# Patient Record
Sex: FEMALE | Race: WHITE | Employment: FULL TIME | URBAN - METROPOLITAN AREA
[De-identification: names, ages, dates, MRNs, and addresses within clinical notes are randomized per-mention and may not be internally consistent; named-entity substitution may affect disease eponyms.]

---

## 2022-08-14 ENCOUNTER — HOSPITAL ENCOUNTER (OUTPATIENT)
Age: 67
Discharge: HOME | End: 2022-08-14
Payer: COMMERCIAL

## 2022-08-14 VITALS
SYSTOLIC BLOOD PRESSURE: 131 MMHG | OXYGEN SATURATION: 100 % | DIASTOLIC BLOOD PRESSURE: 87 MMHG | RESPIRATION RATE: 19 BRPM | TEMPERATURE: 98 F | HEART RATE: 82 BPM

## 2022-08-14 DIAGNOSIS — J06.9 VIRAL UPPER RESPIRATORY TRACT INFECTION: Primary | ICD-10-CM

## 2022-08-14 RX ORDER — BENZONATATE 200 MG/1
200 CAPSULE ORAL 3 TIMES DAILY PRN
Qty: 15 CAPSULE | Refills: 0 | Status: SHIPPED | OUTPATIENT
Start: 2022-08-14

## 2022-11-05 ENCOUNTER — APPOINTMENT (OUTPATIENT)
Dept: GENERAL RADIOLOGY | Facility: HOSPITAL | Age: 67
End: 2022-11-05
Attending: EMERGENCY MEDICINE
Payer: COMMERCIAL

## 2022-11-05 ENCOUNTER — HOSPITAL ENCOUNTER (EMERGENCY)
Facility: HOSPITAL | Age: 67
Discharge: HOME | End: 2022-11-05
Attending: EMERGENCY MEDICINE
Payer: COMMERCIAL

## 2022-11-05 VITALS
DIASTOLIC BLOOD PRESSURE: 77 MMHG | HEART RATE: 77 BPM | TEMPERATURE: 98 F | SYSTOLIC BLOOD PRESSURE: 120 MMHG | RESPIRATION RATE: 20 BRPM | OXYGEN SATURATION: 96 %

## 2022-11-05 DIAGNOSIS — M25.561 RIGHT KNEE PAIN, UNSPECIFIED CHRONICITY: ICD-10-CM

## 2022-11-05 DIAGNOSIS — M25.519 SHOULDER PAIN, UNSPECIFIED CHRONICITY, UNSPECIFIED LATERALITY: Primary | ICD-10-CM

## 2022-11-05 PROCEDURE — 73560 X-RAY EXAM OF KNEE 1 OR 2: CPT | Performed by: EMERGENCY MEDICINE

## 2022-11-05 PROCEDURE — 73030 X-RAY EXAM OF SHOULDER: CPT | Performed by: EMERGENCY MEDICINE

## 2022-11-05 PROCEDURE — 99284 EMERGENCY DEPT VISIT MOD MDM: CPT

## 2022-11-06 NOTE — ED INITIAL ASSESSMENT (HPI)
Patient arrives ambulatory through triage with c/o R. Shoulder pain and R. Knee pain after moving boxes at her office yesterday.

## 2022-11-06 NOTE — DISCHARGE INSTRUCTIONS
Please follow-up with your primary care provider in the next several days for reevaluation of your pain. If your symptoms continue, you may require an MRI of injured area. At this time there is no fracture noted on x-ray. Please elevate your extremity above your heart to reduce swelling and take over-the-counter pain medication as needed for pain. You may take 600 mg of ibuprofen every 6 hours as needed for pain with food for the next 3-5 days if you do not have any kidney problems. The Emergency Department is not intended to be a substitute for an effort to provide complete medical care. The imaging, if any, have often been interpreted on a preliminary basis pending final reading by the radiologist. If your blood pressure was greater than 140/90, please have this blood pressure rechecked by your primary care provider in the next several days. in the next several days.

## 2023-04-21 ENCOUNTER — APPOINTMENT (OUTPATIENT)
Dept: CT IMAGING | Facility: HOSPITAL | Age: 68
End: 2023-04-21
Attending: EMERGENCY MEDICINE
Payer: COMMERCIAL

## 2023-04-21 ENCOUNTER — HOSPITAL ENCOUNTER (EMERGENCY)
Facility: HOSPITAL | Age: 68
Discharge: STILL ADMITTED OR ADMINISTRATIVE | End: 2023-04-21

## 2023-04-21 ENCOUNTER — HOSPITAL ENCOUNTER (EMERGENCY)
Facility: HOSPITAL | Age: 68
Discharge: HOME | End: 2023-04-21
Attending: EMERGENCY MEDICINE
Payer: COMMERCIAL

## 2023-04-21 VITALS
HEIGHT: 66 IN | DIASTOLIC BLOOD PRESSURE: 68 MMHG | TEMPERATURE: 98 F | HEART RATE: 71 BPM | WEIGHT: 155 LBS | SYSTOLIC BLOOD PRESSURE: 114 MMHG | BODY MASS INDEX: 24.91 KG/M2 | RESPIRATION RATE: 18 BRPM | OXYGEN SATURATION: 97 %

## 2023-04-21 DIAGNOSIS — K57.92 ACUTE DIVERTICULITIS: Primary | ICD-10-CM

## 2023-04-21 DIAGNOSIS — K29.00 ACUTE GASTRITIS WITHOUT HEMORRHAGE, UNSPECIFIED GASTRITIS TYPE: ICD-10-CM

## 2023-04-21 LAB
ALBUMIN SERPL-MCNC: 4.2 G/DL (ref 3.4–5)
ALBUMIN/GLOB SERPL: 1.3 {RATIO} (ref 1–2)
ALP LIVER SERPL-CCNC: 112 U/L
ALT SERPL-CCNC: 30 U/L
ANION GAP SERPL CALC-SCNC: 8 MMOL/L (ref 0–18)
AST SERPL-CCNC: 14 U/L (ref 15–37)
BASOPHILS # BLD AUTO: 0.05 X10(3) UL (ref 0–0.2)
BASOPHILS NFR BLD AUTO: 0.4 %
BILIRUB SERPL-MCNC: 1.1 MG/DL (ref 0.1–2)
BILIRUB UR QL: NEGATIVE
BUN BLD-MCNC: 10 MG/DL (ref 7–18)
BUN/CREAT SERPL: 11.8 (ref 10–20)
CALCIUM BLD-MCNC: 9.8 MG/DL (ref 8.5–10.1)
CHLORIDE SERPL-SCNC: 104 MMOL/L (ref 98–112)
CLARITY UR: CLEAR
CO2 SERPL-SCNC: 28 MMOL/L (ref 21–32)
COLOR UR: YELLOW
CREAT BLD-MCNC: 0.85 MG/DL
DEPRECATED RDW RBC AUTO: 43.6 FL (ref 35.1–46.3)
EOSINOPHIL # BLD AUTO: 0.06 X10(3) UL (ref 0–0.7)
EOSINOPHIL NFR BLD AUTO: 0.5 %
ERYTHROCYTE [DISTWIDTH] IN BLOOD BY AUTOMATED COUNT: 12.8 % (ref 11–15)
GFR SERPLBLD BASED ON 1.73 SQ M-ARVRAT: 76 ML/MIN/1.73M2 (ref 60–?)
GLOBULIN PLAS-MCNC: 3.3 G/DL (ref 2.8–4.4)
GLUCOSE BLD-MCNC: 101 MG/DL (ref 70–99)
GLUCOSE UR-MCNC: NORMAL MG/DL
HCT VFR BLD AUTO: 43 %
HGB BLD-MCNC: 14.5 G/DL
HGB UR QL STRIP.AUTO: NEGATIVE
HYALINE CASTS #/AREA URNS AUTO: PRESENT /LPF
IMM GRANULOCYTES # BLD AUTO: 0.03 X10(3) UL (ref 0–1)
IMM GRANULOCYTES NFR BLD: 0.3 %
KETONES UR-MCNC: NEGATIVE MG/DL
LEUKOCYTE ESTERASE UR QL STRIP.AUTO: NEGATIVE
LIPASE SERPL-CCNC: 26 U/L (ref 13–75)
LYMPHOCYTES # BLD AUTO: 2.24 X10(3) UL (ref 1–4)
LYMPHOCYTES NFR BLD AUTO: 20 %
MCH RBC QN AUTO: 31.3 PG (ref 26–34)
MCHC RBC AUTO-ENTMCNC: 33.7 G/DL (ref 31–37)
MCV RBC AUTO: 92.9 FL
MONOCYTES # BLD AUTO: 1.15 X10(3) UL (ref 0.1–1)
MONOCYTES NFR BLD AUTO: 10.3 %
NEUTROPHILS # BLD AUTO: 7.67 X10 (3) UL (ref 1.5–7.7)
NEUTROPHILS # BLD AUTO: 7.67 X10(3) UL (ref 1.5–7.7)
NEUTROPHILS NFR BLD AUTO: 68.5 %
NITRITE UR QL STRIP.AUTO: NEGATIVE
OSMOLALITY SERPL CALC.SUM OF ELEC: 289 MOSM/KG (ref 275–295)
PH UR: 6 [PH] (ref 5–8)
PLATELET # BLD AUTO: 339 10(3)UL (ref 150–450)
POTASSIUM SERPL-SCNC: 3.9 MMOL/L (ref 3.5–5.1)
PROT SERPL-MCNC: 7.5 G/DL (ref 6.4–8.2)
PROT UR-MCNC: 30 MG/DL
RBC # BLD AUTO: 4.63 X10(6)UL
SODIUM SERPL-SCNC: 140 MMOL/L (ref 136–145)
SP GR UR STRIP: 1.03 (ref 1–1.03)
UROBILINOGEN UR STRIP-ACNC: 2
WBC # BLD AUTO: 11.2 X10(3) UL (ref 4–11)

## 2023-04-21 PROCEDURE — 96361 HYDRATE IV INFUSION ADD-ON: CPT

## 2023-04-21 PROCEDURE — 99285 EMERGENCY DEPT VISIT HI MDM: CPT

## 2023-04-21 PROCEDURE — 83690 ASSAY OF LIPASE: CPT | Performed by: EMERGENCY MEDICINE

## 2023-04-21 PROCEDURE — 74176 CT ABD & PELVIS W/O CONTRAST: CPT | Performed by: EMERGENCY MEDICINE

## 2023-04-21 PROCEDURE — 81001 URINALYSIS AUTO W/SCOPE: CPT | Performed by: EMERGENCY MEDICINE

## 2023-04-21 PROCEDURE — 80053 COMPREHEN METABOLIC PANEL: CPT | Performed by: EMERGENCY MEDICINE

## 2023-04-21 PROCEDURE — 99284 EMERGENCY DEPT VISIT MOD MDM: CPT

## 2023-04-21 PROCEDURE — 96375 TX/PRO/DX INJ NEW DRUG ADDON: CPT

## 2023-04-21 PROCEDURE — 96374 THER/PROPH/DIAG INJ IV PUSH: CPT

## 2023-04-21 PROCEDURE — 85025 COMPLETE CBC W/AUTO DIFF WBC: CPT | Performed by: EMERGENCY MEDICINE

## 2023-04-21 RX ORDER — FAMOTIDINE 20 MG/1
20 TABLET, FILM COATED ORAL 2 TIMES DAILY PRN
Qty: 30 TABLET | Refills: 0 | Status: SHIPPED | OUTPATIENT
Start: 2023-04-21 | End: 2023-05-21

## 2023-04-21 RX ORDER — MORPHINE SULFATE 2 MG/ML
2 INJECTION, SOLUTION INTRAMUSCULAR; INTRAVENOUS ONCE
Status: COMPLETED | OUTPATIENT
Start: 2023-04-21 | End: 2023-04-21

## 2023-04-21 RX ORDER — AMOXICILLIN AND CLAVULANATE POTASSIUM 875; 125 MG/1; MG/1
1 TABLET, FILM COATED ORAL 2 TIMES DAILY
Qty: 20 TABLET | Refills: 0 | Status: SHIPPED | OUTPATIENT
Start: 2023-04-21 | End: 2023-05-01

## 2023-04-21 RX ORDER — ONDANSETRON 2 MG/ML
4 INJECTION INTRAMUSCULAR; INTRAVENOUS ONCE
Status: COMPLETED | OUTPATIENT
Start: 2023-04-21 | End: 2023-04-21

## 2023-04-21 RX ORDER — ONDANSETRON 4 MG/1
4 TABLET, ORALLY DISINTEGRATING ORAL EVERY 4 HOURS PRN
Qty: 10 TABLET | Refills: 0 | Status: SHIPPED | OUTPATIENT
Start: 2023-04-21 | End: 2023-04-28

## 2023-04-21 NOTE — ED INITIAL ASSESSMENT (HPI)
Patient ambulatory to ED with complaint of generalized abd pain. Pain has gradually getting worse since getting d/'d from [facility] diverticulitis in March. Symptoms never totally resolved. Endorses nausea. Endorses dysuria. Patient is AXOX4.

## 2023-08-15 ENCOUNTER — OFFICE VISIT (OUTPATIENT)
Facility: CLINIC | Age: 68
End: 2023-08-15

## 2023-08-15 VITALS
DIASTOLIC BLOOD PRESSURE: 76 MMHG | SYSTOLIC BLOOD PRESSURE: 104 MMHG | WEIGHT: 154.63 LBS | HEIGHT: 66 IN | BODY MASS INDEX: 24.85 KG/M2 | HEART RATE: 92 BPM

## 2023-08-15 DIAGNOSIS — K21.9 GASTROESOPHAGEAL REFLUX DISEASE, UNSPECIFIED WHETHER ESOPHAGITIS PRESENT: ICD-10-CM

## 2023-08-15 DIAGNOSIS — R10.84 GENERALIZED ABDOMINAL PAIN: Primary | ICD-10-CM

## 2023-08-15 PROCEDURE — 99204 OFFICE O/P NEW MOD 45 MIN: CPT | Performed by: STUDENT IN AN ORGANIZED HEALTH CARE EDUCATION/TRAINING PROGRAM

## 2023-08-15 PROCEDURE — 3008F BODY MASS INDEX DOCD: CPT | Performed by: STUDENT IN AN ORGANIZED HEALTH CARE EDUCATION/TRAINING PROGRAM

## 2023-08-15 PROCEDURE — 3078F DIAST BP <80 MM HG: CPT | Performed by: STUDENT IN AN ORGANIZED HEALTH CARE EDUCATION/TRAINING PROGRAM

## 2023-08-15 PROCEDURE — 3074F SYST BP LT 130 MM HG: CPT | Performed by: STUDENT IN AN ORGANIZED HEALTH CARE EDUCATION/TRAINING PROGRAM

## 2023-08-15 RX ORDER — AZELASTINE HCL 205.5 UG/1
SPRAY NASAL
COMMUNITY

## 2023-08-15 RX ORDER — DESVENLAFAXINE SUCCINATE 50 MG/1
1 TABLET, EXTENDED RELEASE ORAL DAILY
COMMUNITY
Start: 2023-04-10

## 2023-08-15 RX ORDER — ASPIRIN 325 MG
325 TABLET ORAL DAILY
COMMUNITY

## 2023-08-15 RX ORDER — BREXPIPRAZOLE 1 MG/1
1 TABLET ORAL DAILY
COMMUNITY

## 2023-08-15 RX ORDER — ALPRAZOLAM 1 MG/1
1 TABLET ORAL AS DIRECTED
COMMUNITY

## 2023-08-15 RX ORDER — OMEPRAZOLE 40 MG/1
40 CAPSULE, DELAYED RELEASE ORAL
Qty: 90 CAPSULE | Refills: 0 | Status: SHIPPED | OUTPATIENT
Start: 2023-08-15 | End: 2023-11-13

## 2023-08-15 RX ORDER — IBUPROFEN 800 MG
TABLET ORAL
COMMUNITY

## 2023-08-15 RX ORDER — METHYLPREDNISOLONE 4 MG/1
4 TABLET ORAL DAILY
COMMUNITY

## 2023-08-15 NOTE — H&P
[address] [facility]                                                                                                               Reason for consult: History of diverticulitis, second opinion regarding the need for colonoscopy. Requesting physician or provider: No primary care provider on file. Patient presents with: Follow - Up: Diverticulitis   GERD    HPI:   Luanne Siemens is a 79year old year-old woman with history of anxiety, cholelithiasis, depression, diverticulosis complicated by diverticulitis who presents to GI clinic to discuss the need for colonoscopy after recent episode of diverticulitis. The patient was recently seen at multiple outside hospitals for ongoing left lower quadrant pain related to diverticulitis to which she was admitted for 1 to 2 days for IV antibiotics and discharged on oral antibiotics. Her acute symptoms of abdominal discomfort resolved with use of antibiotics. She also was recently seen at [facility] in their gastrointestinal clinic July 24 for an opinion on what to do with management. In addition to recent episode of diverticulitis she has chronic abdominal discomfort and intermittent diarrhea. 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 She currently takes Pepcid, Allegra, Xyzal for this mast cell activation syndrome. In addition to the abdominal symptoms she also reports sore throat and hives attributed to certain foods that she consumes. She continually reports that she is a \"textbook case for mast cell activation syndrome. \"    The patient had a colonoscopy in 2016 as well as 2021. She has been having colonoscopies every 5 years since the age of approximately 29years old. She reports that she has had 5-6 colonoscopies in the last 25 years.     Per the patient, her last colonoscopy was approximately April 2021. Today in clinic she reports that she is [place] a good day. \"  Currently denies abdominal pain, nausea, vomiting, dysphagia, change in bowel habits. She has a long-standing history of heartburn that is not going away with use of H2 blockade. She has plans for an EGD in the near future with [facility] to assess for mast cells on small bowel biopsies. KUB 7/2023  Impression    1. Nonobstructive bowel gas pattern. 2. Moderate colonic stool burden. CT A/P 3/2023  IMPRESSION:     Findings suspicious for acute sigmoid diverticulitis. Mild pelvic free fluid or ascites. No organized fluid collection identified. CT A/P 5/2022  Impression: Study was performed without any contrast. There is no acute intra-abdominal process. Colonic diverticulosis without evidence of acute diverticulitis. Prior endoscopies:  Colonoscopy (6 prior) but last two 2016 and 2021 with diminutive polyp removed. Soc:  -no smoking  -no Etoh    Wt Readings from Last 6 Encounters:  08/15/23 : 154 lb 9.6 oz (70.1 kg)  04/21/23 : 155 lb (70.3 kg)       History, Medications, Allergies, ROS:      Past Medical History:   Diagnosis Date    Diverticulitis     Essential hypertension     IBS (irritable bowel syndrome)     Seizure ([facility] [address]       No past surgical history on file. Family Hx: No family history on file. Social History:   Social History     Socioeconomic History    Marital status: Single        Medications (Active prior to today's visit):  Current Outpatient Medications   Medication Sig Dispense Refill    Fexofenadine HCl 30 MG Oral Tablet Dispersible Take 1 tablet (30 mg total) by mouth daily. famoTIDine 1.3 mg/ml Oral Suspension Take 0.5 mg/kg by mouth 2 (two) times daily. ALPRAZolam (XANAX) 1 MG Oral Tab Take 1 tablet (1 mg total) by mouth As Directed. REXULTI 1 MG Oral Tab Take 1 tablet by mouth daily. aspirin 325 MG Oral Tab Take 1 tablet (325 mg total) by mouth daily. Azelastine HCl 0.15 % Nasal Solution by Nasal route. Cholecalciferol (VITAMIN D3) 10 MCG (400 UNIT) Oral Cap Take by mouth. Albuterol Sulfate 2 MG Oral Tab Take 1 tablet (2 mg total) by mouth 3 (three) times daily. Omeprazole 40 MG Oral Capsule Delayed Release Take 1 capsule (40 mg total) by mouth before breakfast. Take 1 capsule by mouth daily before breakfast. 90 capsule 0    methylPREDNISolone 4 MG Oral Tab Take 1 tablet (4 mg total) by mouth daily. desvenlafaxine ER 50 MG Oral Tablet 24 Hr Take 1 tablet (50 mg total) by mouth daily. benzonatate 200 MG Oral Cap Take 1 capsule (200 mg total) by mouth 3 (three) times daily as needed for cough. (Patient not taking: Reported on 8/15/2023) 15 capsule 0       Allergies:    Cobalt                  HIVES, RASH  Iodine (Topical)        ANAPHYLAXIS, HIVES  Neomycin-Bacitracin*    HIVES  Nickel                  HIVES, ITCHING, RASH, OTHER (SEE                            COMMENTS), UNKNOWN    Comment:[facility] Allergy Text Annotation: Nickel  Povidone Iodine         HIVES  Radiology Contrast *    HIVES, ITCHING  Levofloxacin            UNKNOWN    ROS:   CONSTITUTIONAL:  negative for fevers, rigors  EYES:  negative for diplopia   RESPIRATORY:  negative for severe shortness of breath  CARDIOVASCULAR:  negative for crushing sub-sternal chest pain  GASTROINTESTINAL:  see HPI  GENITOURINARY:  negative for dysuria or gross hematuria  INTEGUMENT/BREAST:  SKIN:  negative for jaundice   ALLERGIC/IMMUNOLOGIC:  negative for hay fever  ENDOCRINE:  negative for cold intolerance and heat intolerance  MUSCULOSKELETAL:  negative for joint effusion/severe erythema  BEHAVIOR/PSYCH:  negative for psychotic behavior      PHYSICAL EXAM:   Blood pressure 104/76, pulse 92, height 5' 6\" (1.676 m), weight 154 lb 9.6 oz (70.1 kg).     Gen- Patient appears comfortable and in no acute discomfort  HEENT: the sclera appears anicteric, oropharynx clear, mucus membranes appear moist  CV- regular rate and rhythm, the extremities are warm and well perfused   Lung- Moves air well; No labored breathing  Abdomen- soft, non-tender exam in all quadrants without rigidity or guarding, non-distended  Skin- No jaundice  Ext: no edema is evident. Neuro- Alert and interactive, and gross movements of extremities normal  Psych - appropriate, non-agitated    Labs/Imaging:     Patient's pertinent labs and imaging were reviewed and discussed with patient today. ASSESSMENT/PLAN:   Arianna Santos is a 79year old year-old woman with history of anxiety, cholelithiasis, depression, diverticulosis complicated by diverticulitis who presents to GI clinic to discuss the need for colonoscopy after recent episode of diverticulitis. #Diverticulitis  -Recent episode of diverticulitis seen on CT scan of the abdomen pelvis in March 2023 and April 2023. She presented to the hospital for lower quadrant pain and she was briefly admitted to the hospital for 1 to 2 days of IV antibiotics and discharged oral antibiotics. Her symptoms of acute/severe left lower quadrant pain resolved with use of antibiotics. She currently denies abdominal pain, nausea, vomiting, fevers. She had a colonoscopy in 2021 that showed a diminutive polyp. Prior to this, she had approximately 5 colonoscopy since the age of 30 due to a history of a diminutive adenomatous polyp. Given that she has had 5-6 colonoscopies in the last 25 years and her last one was approximately 2 years ago I do not believe she needs a repeat colonoscopy at this time for an episode of diverticulitis. #Reported hx of mast cell activation syndrome (follows at [facility])  She follows with Dr. Anastasia Santiago at [facility] for this and she is currently taking H2 blocker, Rebel Blazing. She was just seen in clinic by them in July of this year. She reports that there is a plan for an eventual upper endoscopy with small bowel and gastric biopsies.   Today in clinic she essentially reports no abdominal discomfort and no issues with her bowel habits. She should continue to follow-up at [facility] for this given her longstanding history at that institution. Orders This Visit:  Orders Placed This Encounter      Helicobacter Pylori Breath Test, Adult      Meds This Visit:  Requested Prescriptions     Signed Prescriptions Disp Refills    Omeprazole 40 MG Oral Capsule Delayed Release 90 capsule 0     Sig: Take 1 capsule (40 mg total) by mouth before breakfast. Take 1 capsule by mouth daily before breakfast.       Imaging & Referrals:  None     Crystal Addison MD  [facility]  8/15/2023      This note was partially prepared using [employer] voice recognition dictation software. As a result, errors may occur. When identified, these errors have been corrected.  While every attempt is made to correct errors during dictation, discrepancies may still exist.

## 2023-11-10 ENCOUNTER — TELEPHONE (OUTPATIENT)
Facility: CLINIC | Age: 68
End: 2023-11-10

## 2023-11-10 NOTE — TELEPHONE ENCOUNTER
1st,overdue reminder letter sent to patient via my chart.     Helicobacter Pylori Breath Test, Adult (Order #[id number]) on 8/15/23

## 2023-11-11 DIAGNOSIS — K21.9 GASTROESOPHAGEAL REFLUX DISEASE, UNSPECIFIED WHETHER ESOPHAGITIS PRESENT: ICD-10-CM

## 2023-11-13 RX ORDER — OMEPRAZOLE 40 MG/1
40 CAPSULE, DELAYED RELEASE ORAL
Qty: 90 CAPSULE | Refills: 0 | Status: SHIPPED | OUTPATIENT
Start: 2023-11-13

## 2023-11-13 NOTE — TELEPHONE ENCOUNTER
Requested Prescriptions     Pending Prescriptions Disp Refills    OMEPRAZOLE 40 MG Oral Capsule Delayed Release [Pharmacy Med Name: OMEPRAZOLE DR 40 MG CAPSULE] 90 capsule 0     Sig: TAKE 1 CAPSULE BY MOUTH EVERY DAY BEFORE BREAKFAST     LOV: 8/15/23    Last refill: 8/15/23      Linda Isaac Meals -please review and sign pended order if agreeable.

## 2023-12-08 ENCOUNTER — TELEPHONE (OUTPATIENT)
Facility: CLINIC | Age: 68
End: 2023-12-08

## 2023-12-08 NOTE — TELEPHONE ENCOUNTER
Patient calling states tried to schedule H Pylori test but central scheduling states does not have order, please call.      Fax: [phone]

## 2023-12-08 NOTE — TELEPHONE ENCOUNTER
Left a detailed message on the pt's voice mail that she needs to go to the lab for the breath test    She does not go through Central Scheduling to complete the test    It is a lab test    Also advised to avoid PPI/H2 blockers for 2 weeks prior to testing    No food or drink for 4 hours prior to testing    To call back with questions/concerns.  Left phone number

## 2025-08-01 ENCOUNTER — HOSPITAL ENCOUNTER (EMERGENCY)
Facility: HOSPITAL | Age: 70
Discharge: HOME | End: 2025-08-01
Attending: EMERGENCY MEDICINE

## 2025-08-01 ENCOUNTER — HOSPITAL ENCOUNTER (OUTPATIENT)
Dept: CV DIAGNOSTICS | Facility: HOSPITAL | Age: 70
Discharge: HOME | End: 2025-08-01
Attending: EMERGENCY MEDICINE

## 2025-08-01 VITALS
OXYGEN SATURATION: 97 % | SYSTOLIC BLOOD PRESSURE: 119 MMHG | HEIGHT: 67 IN | WEIGHT: 175 LBS | RESPIRATION RATE: 13 BRPM | BODY MASS INDEX: 27.47 KG/M2 | HEART RATE: 47 BPM | DIASTOLIC BLOOD PRESSURE: 63 MMHG | TEMPERATURE: 98 F

## 2025-08-01 DIAGNOSIS — R00.1 BRADYCARDIA: ICD-10-CM

## 2025-08-01 DIAGNOSIS — R00.1 BRADYCARDIA: Primary | ICD-10-CM

## 2025-08-01 LAB
ALBUMIN SERPL-MCNC: 4.7 G/DL (ref 3.2–4.8)
ALP LIVER SERPL-CCNC: 105 U/L (ref 55–142)
ALT SERPL-CCNC: 21 U/L (ref 10–49)
ANION GAP SERPL CALC-SCNC: 8 MMOL/L (ref 0–18)
AST SERPL-CCNC: 21 U/L (ref ?–34)
ATRIAL RATE: 37 BPM
BASOPHILS # BLD AUTO: 0.03 X10(3) UL (ref 0–0.2)
BASOPHILS NFR BLD AUTO: 0.5 %
BILIRUB DIRECT SERPL-MCNC: 0.2 MG/DL (ref ?–0.3)
BILIRUB SERPL-MCNC: 0.7 MG/DL (ref 0.2–1.1)
BUN BLD-MCNC: 13 MG/DL (ref 9–23)
BUN/CREAT SERPL: 12.7 (ref 10–20)
CALCIUM BLD-MCNC: 9.7 MG/DL (ref 8.7–10.4)
CHLORIDE SERPL-SCNC: 105 MMOL/L (ref 98–112)
CO2 SERPL-SCNC: 27 MMOL/L (ref 21–32)
CREAT BLD-MCNC: 1.02 MG/DL (ref 0.55–1.02)
DEPRECATED RDW RBC AUTO: 44.3 FL (ref 35.1–46.3)
EGFRCR SERPLBLD CKD-EPI 2021: 60 ML/MIN/1.73M2 (ref 60–?)
EOSINOPHIL # BLD AUTO: 0.16 X10(3) UL (ref 0–0.7)
EOSINOPHIL NFR BLD AUTO: 2.5 %
ERYTHROCYTE [DISTWIDTH] IN BLOOD BY AUTOMATED COUNT: 13.2 % (ref 11–15)
GLUCOSE BLD-MCNC: 97 MG/DL (ref 70–99)
GLUCOSE BLDC GLUCOMTR-MCNC: 92 MG/DL (ref 70–99)
HCT VFR BLD AUTO: 42.3 % (ref 35–48)
HGB BLD-MCNC: 14.5 G/DL (ref 12–16)
IMM GRANULOCYTES # BLD AUTO: 0.02 X10(3) UL (ref 0–1)
IMM GRANULOCYTES NFR BLD: 0.3 %
LYMPHOCYTES # BLD AUTO: 2.35 X10(3) UL (ref 1–4)
LYMPHOCYTES NFR BLD AUTO: 37.2 %
MCH RBC QN AUTO: 31.4 PG (ref 26–34)
MCHC RBC AUTO-ENTMCNC: 34.3 G/DL (ref 31–37)
MCV RBC AUTO: 91.6 FL (ref 80–100)
MONOCYTES # BLD AUTO: 0.6 X10(3) UL (ref 0.1–1)
MONOCYTES NFR BLD AUTO: 9.5 %
NEUTROPHILS # BLD AUTO: 3.15 X10 (3) UL (ref 1.5–7.7)
NEUTROPHILS # BLD AUTO: 3.15 X10(3) UL (ref 1.5–7.7)
NEUTROPHILS NFR BLD AUTO: 50 %
OSMOLALITY SERPL CALC.SUM OF ELEC: 290 MOSM/KG (ref 275–295)
P AXIS: 60 DEGREES
P-R INTERVAL: 174 MS
PLATELET # BLD AUTO: 329 10(3)UL (ref 150–450)
POTASSIUM SERPL-SCNC: 3.6 MMOL/L (ref 3.5–5.1)
PROT SERPL-MCNC: 6.7 G/DL (ref 5.7–8.2)
Q-T INTERVAL: 494 MS
QRS DURATION: 80 MS
QTC CALCULATION (BEZET): 387 MS
R AXIS: 33 DEGREES
RBC # BLD AUTO: 4.62 X10(6)UL (ref 3.8–5.3)
SODIUM SERPL-SCNC: 140 MMOL/L (ref 136–145)
T AXIS: 44 DEGREES
TROPONIN I SERPL HS-MCNC: 3 NG/L (ref ?–34)
VENTRICULAR RATE: 37 BPM
WBC # BLD AUTO: 6.3 X10(3) UL (ref 4–11)

## 2025-08-01 PROCEDURE — 93010 ELECTROCARDIOGRAM REPORT: CPT

## 2025-08-01 PROCEDURE — 93242 EXT ECG>48HR<7D RECORDING: CPT | Performed by: EMERGENCY MEDICINE

## 2025-08-01 PROCEDURE — 85025 COMPLETE CBC W/AUTO DIFF WBC: CPT | Performed by: EMERGENCY MEDICINE

## 2025-08-01 PROCEDURE — 82962 GLUCOSE BLOOD TEST: CPT

## 2025-08-01 PROCEDURE — 99285 EMERGENCY DEPT VISIT HI MDM: CPT

## 2025-08-01 PROCEDURE — 84484 ASSAY OF TROPONIN QUANT: CPT | Performed by: EMERGENCY MEDICINE

## 2025-08-01 PROCEDURE — 80076 HEPATIC FUNCTION PANEL: CPT | Performed by: EMERGENCY MEDICINE

## 2025-08-01 PROCEDURE — 80048 BASIC METABOLIC PNL TOTAL CA: CPT | Performed by: EMERGENCY MEDICINE

## 2025-08-01 PROCEDURE — 93243 EXT ECG>48HR<7D SCAN A/R: CPT | Performed by: EMERGENCY MEDICINE

## 2025-08-01 PROCEDURE — 93005 ELECTROCARDIOGRAM TRACING: CPT

## 2025-08-01 PROCEDURE — 36415 COLL VENOUS BLD VENIPUNCTURE: CPT

## (undated) NOTE — LETTER
11/10/2023              Shyanne Gaffney        [facility] [address]         Dear Kezia Pritchett records indicate that the tests ordered for you by Tatiana Nguyen MD  have not been done. If you have, in fact, already completed the tests or you do not wish to have the tests done, please contact our office at [address] [place] [address]. Otherwise, please proceed with the testing. Enclosed is a duplicate order for your convenience.   Labs Order:    Helicobacter Pylori Breath Test, Adult (Order #[id number]) on 8/15/23         Sincerely,    Tatiana Nguyen MD  [facility], [address] [address]  [phone]

## (undated) NOTE — LETTER
Date & Time: 8/14/2022, 3:25 PM  Patient: Luanne Siemens      To Whom It May Concern:    Luanne Siemens was seen and treated in our department on 8/14/2022. She may return to work immediately, but should be allowed to work from home until 8/20/22.      If you have any questions or concerns, please do not hesitate to call.        _____________________________  Physician/[facility] Signature